# Patient Record
Sex: FEMALE | Race: WHITE | URBAN - METROPOLITAN AREA
[De-identification: names, ages, dates, MRNs, and addresses within clinical notes are randomized per-mention and may not be internally consistent; named-entity substitution may affect disease eponyms.]

---

## 2021-05-28 ENCOUNTER — RECORDS - HEALTHEAST (OUTPATIENT)
Dept: ADMINISTRATIVE | Facility: CLINIC | Age: 51
End: 2021-05-28

## 2021-05-31 ENCOUNTER — RECORDS - HEALTHEAST (OUTPATIENT)
Dept: ADMINISTRATIVE | Facility: CLINIC | Age: 51
End: 2021-05-31

## 2021-06-02 ENCOUNTER — RECORDS - HEALTHEAST (OUTPATIENT)
Dept: ADMINISTRATIVE | Facility: CLINIC | Age: 51
End: 2021-06-02

## 2021-07-21 ENCOUNTER — RECORDS - HEALTHEAST (OUTPATIENT)
Dept: ADMINISTRATIVE | Facility: CLINIC | Age: 51
End: 2021-07-21

## 2025-06-09 ENCOUNTER — THERAPY VISIT (OUTPATIENT)
Dept: PHYSICAL THERAPY | Facility: CLINIC | Age: 55
End: 2025-06-09
Payer: COMMERCIAL

## 2025-06-09 DIAGNOSIS — N81.89 PELVIC FLOOR WEAKNESS IN FEMALE: ICD-10-CM

## 2025-06-09 DIAGNOSIS — K59.00 CONSTIPATION: Primary | ICD-10-CM

## 2025-06-09 PROCEDURE — 97161 PT EVAL LOW COMPLEX 20 MIN: CPT | Mod: GP | Performed by: PHYSICAL THERAPIST

## 2025-06-09 PROCEDURE — 97530 THERAPEUTIC ACTIVITIES: CPT | Mod: GP | Performed by: PHYSICAL THERAPIST

## 2025-06-09 PROCEDURE — 97112 NEUROMUSCULAR REEDUCATION: CPT | Mod: GP | Performed by: PHYSICAL THERAPIST

## 2025-06-09 NOTE — PROGRESS NOTES
PHYSICAL THERAPY EVALUATION  Type of Visit: Evaluation    Pt is currently in a safe environment.     Fall Risk Screen:  Have you fallen 2 or more times in the past year?: No  Have you fallen and had an injury in the past year?: No    Subjective         Presenting condition or subjective complaint: Incomplete bowel movements and urgency. Onset of bowel urgency and incontinence about 3 years ago. No known onset. Has always had some stomach issues but the urgency has gotten more consistent and more challenging to her. Has had good and bad days. Denies pain but does note vaginal pressure. Pressure can be anytime and not necessarily towards the end of the day. Started with [facility] and used mag but that caused looser stools. Went to [facility] and was treated with probiotics which helped a little bit but nothing significant. Went to PFPT to focus on strengthening all externally around the pelvis. Then went to PF program at [place] and went to PT there. Was told she had a higher resting tone in the anal area but could expel the balloon. Transit study was quick so doesn't drink as much following. Does have uterine and bladder prolapse. Could not empty all of the contrast with MRI. Emptied about 50% of it. Saw urogyn at [place] who also said there wasn't anything pushing into the rectum. Recommended loop iliostomy which she didn't do. Saw someone about sacral nerve stimulator and wasn't a candidate for that. Did see PT at [place] and had a lot of tightness of the pelvic floor. Took about 6 months off and also now has a huge anxiety related to this. Doesn't drive in cars with people, doesn't go on flights anymore. Depression set in related to that. Has been working on breathing, yoga, is starting acupuncture, therapy, etc to help manage these things. Will sometimes get nauseated and dizzy. Has been fitted with a pessary as well which she's not 100% sure if it's helped or not because she feels like she maybe can't go as easily.   Date  of onset: 06/09/22    Relevant medical history: Bladder or bowel problems; Depression; Menopause; Mental Illness; Osteoporosis   Dates & types of surgery: cataract surgery 2015    Prior diagnostic imaging/testing results: MRI; X-ray; Other transit study, anorectal manometry   Prior therapy history for the same diagnosis, illness or injury: Yes [facility] Summer of 2024 and a few follow ups    Prior Level of Function  Transfers:   Ambulation:   ADL:   IADL:     Living Environment  Social support: With a significant other or spouse   Type of home: House   Stairs to enter the home: Yes 3 Is there a railing: Yes     Ramp: No   Stairs inside the home: Yes 12 Is there a railing: Yes     Help at home: None  Equipment owned:       Employment: No    Hobbies/Interests: yoga reading cooking    Patient goals for therapy: Travel, walk with friends, work, drive in the car with other people    Pain assessment:      Objective      PELVIC EVALUATION  ADDITIONAL HISTORY:  Sex assigned at birth: Female  Gender identity: Female    Pronouns: She/Her Hers      Bladder History:  Feels bladder filling: Yes  Triggers for feeling of inability to wait to go to the bathroom: No    How long can you wait to urinate: hout  Gets up at night to urinate: Yes 1  Can stop the flow of urine when urinating: Yes  Volume of urine usually released: Medium, normal-generally voids every couple hours  Other issues:  Feels completely empty.   Number of bladder infections in last 12 months:    Fluid intake per day: 64oz 6 oz coffee    Medications taken for bladder: No     Activities causing urine leak:      Amount of urine typically leaked:    Pads used to help with leaking:          Bowel History:  Frequency of bowel movement: 1 to 3times a day partial  Consistency of stool: Hard  If it's Type1, she may have to externally splint. Type 1-6 but typically in type 2.   Ignores the urge to defecate: No  Other bowel issues: Loss of stool; Straining to have bowel  movement When she leaks stool, it's more of a soft stool. The leakage is related to urge. Tries to wait about 3-5 min if she's at home before she goes. The time from urge to leakage is better.   Length of time spent trying to have a bowel movement: 5min  Not currently taking anything to help with bowel movements. Is doing 21 G of fiber and playing around with soluble and insoluble fibers. Will sometimes take the fruit chews at the end of the day if she doesn't get enough in. Miralax created more urgency. Was on 1 capful 1x/day. Has tried prunes/prune juice but is sensitive to sorbitol. Has tried castor oil in her belly button. Has tried laxatives and enemas but seems to take so long for her body to process through those things.     Sexual Function History:  Sexual orientation: Straight    Sexually active: Yes  Lubrication used: Yes Yes  Pelvic pain:      Pain or difficulty with orgasms/erection/ejaculation: No    State of menopause: Post-menopause (I am done with menopause)  Hormone medications: No Is on vaginal estrogen right now but is not on HRT systemically. Someone just suggested she go back on it and hasn't started it yet. Gave her a patch for estrogen and a pill for progesterone. Hasn't taken either one yet.     Are you currently pregnant: No  Number of previous pregnancies: 2  Number of deliveries: 2  If you have delivered before, did you have any of these issues during delivery: Tearing; Episiotomy; Vaginal delivery  Have you been diagnosed with pelvic prolapse or abdominal separation: Yes  Do you get regular exercise: Yes  I do this type of exercise: walk yoga oulates  Have you tried pelvic floor strengthening exercises for 4 weeks: Yes  Do you have any history of trauma that is relevant to your care that you d like to share: No      Discussed reason for referral regarding pelvic health needs and external/internal pelvic floor muscle examination with patient/guardian.  Opportunity provided to ask questions  and verbal consent for assessment and intervention was given.    PAIN:   POSTURE:   LUMBAR SCREEN: AROM WNL  HIP SCREEN:  Strength: WNL   Functional Strength Testing:     PELVIC/SI SCREEN:  Anastasiya (March): Negative. Normal pelvic alignment overall however did appear to possibly have R posterior ilium in standing.   PAIN PROVOCATION TEST:   PELVIS/SI SPECIAL TESTS: WNL  BREATHING SYMMETRY: Decreased rib cage mobility    PELVIC EXAM  External Visual Inspection:  At rest: Normal  With voluntary pelvic floor contraction: Present, but minimal  Relaxation of PFM: Partial/delayed relaxation  With intra-abdominal pressure: Bearing down as defecation: Perineal descent    Integumentary:   Introitus: Atrophy  Anal: small hemorrhoid noted    External Digital Palpation per Perineum:   Ischiocavernosis: Unremarkable  Bulbo cavernosis: Unremarkable  Transverse perineal: Unremarkable  Levator ani: Unremarkable  Perineal body: slight tightness, denied pain  Public symphysis: Unremarkable    Scar:   Location/Type: episiotomy midline  Mobility: Hypomobile, slight    Internal Digital Palpation:  Per Vagina:  Myofascial Resistance to Palpation: Soft  Digital Muscle Performance: P (Power): L 3/5, R 2/5. Did have symmetry in paraurethral structures however, weakness noted in levator ani on R. Was able to palpate contraction of levator ani at posterior pubic bone so no concern for levator avulsion.   Compensations: Abdominals, Adductors, Gluts, Breath holding  Relaxation Post-Contraction: Partial/delayed relaxation    Per Rectum:  Myofascial Resistance to Palpation: Soft, decreased resting tone of EAS  Digital Muscle Performance: P (Power): 2/5 with poor squeeze pressure of EAS. EAS strength 1/5.   Compensations: Adductors, Gluts, Breath holding  Relaxation Post-Contraction: Partial/delayed relaxation, with cue to bear down, did feel slight lengthening of puborectalis however no movement noted of EAS.       Pelvic Organ Prolapse:    Anterior: 1 cm above level of the hymen    ABDOMINAL ASSESSMENT  Diastasis Rectus Abdominis (LUKASZ):      Abdominal Activation/Strength: SLR: Positive on R for poor load transfer. Hip drop present.     Scar:   Location/Type:   Mobility:     Fascial Tension/Restriction: Local listenening: Cecum, ascending colon, sigmoid colon. Overall, does have mild bloating and congestion noted throughout.    BIOFEEDBACK:  Position:   Surface Electrodes:     Abdominals:     Perianals:       DERMATOMES:   DTR S:     Assessment & Plan   CLINICAL IMPRESSIONS  Medical Diagnosis: Constipation, Prolapse, PFD    Treatment Diagnosis: Constipation, Prolapse, PFD   Impression/Assessment: Patient is a 54 year old female with Bowel and prolapse complaints.  The following significant findings have been identified: Decreased strength, Impaired muscle performance, Decreased activity tolerance, and Impaired posture. These impairments interfere with their ability to perform self care tasks, work tasks, recreational activities, household chores, driving , household mobility, and community mobility as compared to previous level of function.     Clinical Decision Making (Complexity):  Clinical Presentation: Stable/Uncomplicated  Clinical Presentation Rationale: based on medical and personal factors listed in PT evaluation  Clinical Decision Making (Complexity): Low complexity    PLAN OF CARE  Treatment Interventions:  Modalities: Biofeedback, Dry Needling, E-stim  Interventions: Manual Therapy, Neuromuscular Re-education, Therapeutic Activity, Therapeutic Exercise, Self-Care/Home Management    Long Term Goals     PT Goal 1  Goal Identifier: complete defecation  Goal Description: Pt to report ability to fully evacuate bowels with proper mechanics without difficulty  Rationale: to maximize safety and independence with performance of ADLs and functional tasks;to maximize safety and independence within the home;to maximize safety and independence within  the community;to maximize safety and independence with self cares;to maximize safety and independence with transportation  Target Date: 09/01/25  PT Goal 2  Goal Identifier: Fecal incontinence  Goal Description: Pt to be able hold bowel urge without leakage to be able to get to the toilet.  Rationale: to maximize safety and independence with performance of ADLs and functional tasks;to maximize safety and independence within the home;to maximize safety and independence within the community;to maximize safety and independence with transportation;to maximize safety and independence with self cares  Target Date: 09/01/25      Frequency of Treatment: 1x/wk  Duration of Treatment: 12 weeks    Recommended Referrals to Other Professionals:   Education Assessment:   Learner/Method: No Barriers to Learning    Risks and benefits of evaluation/treatment have been explained.   Patient/Family/caregiver agrees with Plan of Care.     Evaluation Time:     PT Eval, Low Complexity Minutes (17226): 30       Signing Clinician: Valerie Brandon PT

## 2025-06-16 ENCOUNTER — THERAPY VISIT (OUTPATIENT)
Dept: PHYSICAL THERAPY | Facility: CLINIC | Age: 55
End: 2025-06-16
Payer: COMMERCIAL

## 2025-06-16 DIAGNOSIS — N81.89 PELVIC FLOOR WEAKNESS IN FEMALE: ICD-10-CM

## 2025-06-16 DIAGNOSIS — K59.00 CONSTIPATION: Primary | ICD-10-CM

## 2025-06-16 PROCEDURE — 97110 THERAPEUTIC EXERCISES: CPT | Mod: GP | Performed by: PHYSICAL THERAPIST

## 2025-06-16 PROCEDURE — 97530 THERAPEUTIC ACTIVITIES: CPT | Mod: GP | Performed by: PHYSICAL THERAPIST

## 2025-06-29 ENCOUNTER — HEALTH MAINTENANCE LETTER (OUTPATIENT)
Age: 55
End: 2025-06-29

## 2025-06-30 ENCOUNTER — THERAPY VISIT (OUTPATIENT)
Dept: PHYSICAL THERAPY | Facility: CLINIC | Age: 55
End: 2025-06-30
Payer: COMMERCIAL

## 2025-06-30 DIAGNOSIS — K59.00 CONSTIPATION: Primary | ICD-10-CM

## 2025-06-30 DIAGNOSIS — N81.89 PELVIC FLOOR WEAKNESS IN FEMALE: ICD-10-CM

## 2025-06-30 PROCEDURE — 97530 THERAPEUTIC ACTIVITIES: CPT | Mod: GP | Performed by: PHYSICAL THERAPIST

## 2025-06-30 PROCEDURE — 97110 THERAPEUTIC EXERCISES: CPT | Mod: GP | Performed by: PHYSICAL THERAPIST

## 2025-06-30 PROCEDURE — 97140 MANUAL THERAPY 1/> REGIONS: CPT | Mod: GP | Performed by: PHYSICAL THERAPIST

## 2025-08-25 ENCOUNTER — THERAPY VISIT (OUTPATIENT)
Dept: PHYSICAL THERAPY | Facility: CLINIC | Age: 55
End: 2025-08-25
Payer: COMMERCIAL

## 2025-08-25 DIAGNOSIS — N81.89 PELVIC FLOOR WEAKNESS IN FEMALE: ICD-10-CM

## 2025-08-25 DIAGNOSIS — K59.00 CONSTIPATION: Primary | ICD-10-CM

## 2025-08-25 PROCEDURE — 97110 THERAPEUTIC EXERCISES: CPT | Mod: GP | Performed by: PHYSICAL THERAPIST

## 2025-08-25 PROCEDURE — 97140 MANUAL THERAPY 1/> REGIONS: CPT | Mod: GP | Performed by: PHYSICAL THERAPIST
